# Patient Record
Sex: FEMALE | HISPANIC OR LATINO | URBAN - METROPOLITAN AREA
[De-identification: names, ages, dates, MRNs, and addresses within clinical notes are randomized per-mention and may not be internally consistent; named-entity substitution may affect disease eponyms.]

---

## 2019-01-10 ENCOUNTER — OFFICE VISIT (OUTPATIENT)
Dept: URBAN - METROPOLITAN AREA CLINIC 88 | Facility: CLINIC | Age: 84
End: 2019-01-10
Payer: COMMERCIAL

## 2019-01-10 DIAGNOSIS — H26.492 OTHER SECONDARY CATARACT, LEFT EYE: ICD-10-CM

## 2019-01-10 DIAGNOSIS — H04.123 DRY EYE SYNDROME OF BILATERAL LACRIMAL GLANDS: ICD-10-CM

## 2019-01-10 PROCEDURE — 99214 OFFICE O/P EST MOD 30 MIN: CPT | Performed by: OPHTHALMOLOGY

## 2019-01-10 ASSESSMENT — INTRAOCULAR PRESSURE
OS: 12
OD: 12

## 2019-01-10 NOTE — IMPRESSION/PLAN
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 Condition: established, stable. Symptoms: will continue to monitor. Plan: Discussed diagnosis in detail with patient. Discussed ocular and systemic benefits of blood sugar control. Keep future appts. with PCP. No treatment necessary at this time. Patient was instructed to monitor vision for sudden changes and to call if visual changes noted. 

HD (Monday, Wednesday and Friday)

## 2019-01-10 NOTE — IMPRESSION/PLAN
Impression: Presence of intraocular lens: Z96.1. OU. Condition: established, stable. Plan: Stable at this time.

## 2019-01-10 NOTE — IMPRESSION/PLAN
Impression: Dry eye syndrome of bilateral lacrimal glands: H04.123. OU. Condition: established, stable. Symptoms: will continue to monitor. Plan: Discussed diagnosis in detail with patient. Explained condition does not have a cure and will need artificial tears for maintenance. Sample of Systane Gel was given to use TID OS and continue with OTC ART tears 1 qtt TID OD.

## 2019-01-10 NOTE — IMPRESSION/PLAN
Impression: Other secondary cataract, left eye: H26.492. OS. Condition: established, stable. Plan: Discussed diagnosis in detail with patient. May need Yag Laser treatment in the future. Discussed risks/benefits of laser TX.

## 2021-05-11 ENCOUNTER — OFFICE VISIT (OUTPATIENT)
Dept: URBAN - METROPOLITAN AREA CLINIC 88 | Facility: CLINIC | Age: 86
End: 2021-05-11
Payer: COMMERCIAL

## 2021-05-11 DIAGNOSIS — H35.443 AGE-RELATED RETICULAR DEGENERATION OF RETINA, BILATERAL: ICD-10-CM

## 2021-05-11 DIAGNOSIS — E11.3293 TYPE 2 DIAB WITH MILD NONP RTNOP WITHOUT MACULAR EDEMA, BI: Primary | ICD-10-CM

## 2021-05-11 DIAGNOSIS — Z96.1 PRESENCE OF INTRAOCULAR LENS: ICD-10-CM

## 2021-05-11 PROCEDURE — 99214 OFFICE O/P EST MOD 30 MIN: CPT | Performed by: OPHTHALMOLOGY

## 2021-05-11 ASSESSMENT — INTRAOCULAR PRESSURE
OS: 12
OD: 12

## 2021-05-11 NOTE — IMPRESSION/PLAN
Impression: Age-related reticular degeneration of retina, bilateral: H35.443. Plan: Discussed signs and symptoms of PVD/floaters. Patient instructed to call the office immediately if any symptoms noted.

## 2021-05-11 NOTE — IMPRESSION/PLAN
Impression: Presence of intraocular lens: Z96.1 OU. Condition: established, stable. Plan: Stable.  Observe

## 2021-05-11 NOTE — IMPRESSION/PLAN
Impression: Type 2 diab with mild nonp rtnop without macular edema, bi: H01.9399. Condition: established, stable. Plan: Discussed diagnosis in detail with patient. Discussed ocular and systemic benefits of blood sugar control. Keep future appts. with PCP. No treatment necessary at this time. Patient was instructed to monitor vision for sudden changes and to call if visual changes noted.

## 2022-05-17 ENCOUNTER — OFFICE VISIT (OUTPATIENT)
Dept: URBAN - METROPOLITAN AREA CLINIC 88 | Facility: CLINIC | Age: 87
End: 2022-05-17
Payer: MEDICARE

## 2022-05-17 DIAGNOSIS — E11.3293 TYPE 2 DIAB WITH MILD NONP RTNOP WITHOUT MACULAR EDEMA, BI: Primary | ICD-10-CM

## 2022-05-17 DIAGNOSIS — H57.10 OCULAR PAIN, UNSPECIFIED EYE: ICD-10-CM

## 2022-05-17 DIAGNOSIS — H26.492 OTHER SECONDARY CATARACT, LEFT EYE: ICD-10-CM

## 2022-05-17 DIAGNOSIS — H35.443 AGE-RELATED RETICULAR DEGENERATION OF RETINA, BILATERAL: ICD-10-CM

## 2022-05-17 DIAGNOSIS — Z96.1 PRESENCE OF INTRAOCULAR LENS: ICD-10-CM

## 2022-05-17 PROCEDURE — 99214 OFFICE O/P EST MOD 30 MIN: CPT | Performed by: OPHTHALMOLOGY

## 2022-05-17 RX ORDER — LOTEPREDNOL ETABONATE 5 MG/ML
0.5 % SUSPENSION/ DROPS OPHTHALMIC
Qty: 90 | Refills: 1 | Status: ACTIVE
Start: 2022-05-17

## 2022-05-17 ASSESSMENT — INTRAOCULAR PRESSURE
OS: 13
OD: 13

## 2022-05-17 NOTE — IMPRESSION/PLAN
Impression: Type 2 diab with mild nonp rtnop without macular edema, bi: O40.2414. Plan: Discussed diagnosis in detail with patient. Discussed ocular and systemic benefits of blood sugar control. Keep future appts. with PCP. No treatment necessary at this time. Patient was instructed to monitor vision for sudden changes and to call if visual changes noted.

## 2022-05-17 NOTE — IMPRESSION/PLAN
Impression: Ocular pain, unspecified eye: H57.10. Plan: Discussed diagnosis in detail with patient and patient's daughter. Patient will start on Lotemax BID OS generic.